# Patient Record
Sex: MALE | Race: WHITE | NOT HISPANIC OR LATINO | Employment: OTHER | URBAN - NONMETROPOLITAN AREA
[De-identification: names, ages, dates, MRNs, and addresses within clinical notes are randomized per-mention and may not be internally consistent; named-entity substitution may affect disease eponyms.]

---

## 2023-12-26 PROBLEM — Z79.01 ANTICOAGULATED: Status: ACTIVE | Noted: 2023-12-26

## 2023-12-26 PROBLEM — I48.92 PAROXYSMAL ATRIAL FLUTTER (MULTI): Status: ACTIVE | Noted: 2023-12-26

## 2023-12-26 RX ORDER — ASPIRIN 81 MG/1
81 TABLET ORAL
COMMUNITY

## 2023-12-26 RX ORDER — METOPROLOL SUCCINATE 25 MG/1
0.5 TABLET, EXTENDED RELEASE ORAL DAILY
COMMUNITY

## 2023-12-26 RX ORDER — DIGOXIN 125 MCG
125 TABLET ORAL DAILY
COMMUNITY

## 2024-01-22 ENCOUNTER — OFFICE VISIT (OUTPATIENT)
Dept: CARDIOLOGY | Facility: CLINIC | Age: 89
End: 2024-01-22
Payer: MEDICARE

## 2024-01-22 VITALS
BODY MASS INDEX: 23.95 KG/M2 | HEART RATE: 80 BPM | HEIGHT: 66 IN | DIASTOLIC BLOOD PRESSURE: 72 MMHG | SYSTOLIC BLOOD PRESSURE: 110 MMHG | WEIGHT: 149 LBS

## 2024-01-22 DIAGNOSIS — I48.92 PAROXYSMAL ATRIAL FLUTTER (MULTI): Primary | ICD-10-CM

## 2024-01-22 PROBLEM — R31.9 HEMATURIA: Status: ACTIVE | Noted: 2024-01-22

## 2024-01-22 PROBLEM — Z79.01 ANTICOAGULATED: Status: RESOLVED | Noted: 2023-12-26 | Resolved: 2024-01-22

## 2024-01-22 PROCEDURE — 1036F TOBACCO NON-USER: CPT | Performed by: INTERNAL MEDICINE

## 2024-01-22 PROCEDURE — 1159F MED LIST DOCD IN RCRD: CPT | Performed by: INTERNAL MEDICINE

## 2024-01-22 PROCEDURE — 99214 OFFICE O/P EST MOD 30 MIN: CPT | Performed by: INTERNAL MEDICINE

## 2024-01-22 RX ORDER — TAMSULOSIN HYDROCHLORIDE 0.4 MG/1
1 CAPSULE ORAL NIGHTLY
COMMUNITY
Start: 2023-07-07

## 2024-01-22 NOTE — PROGRESS NOTES
"Subjective   Omi Coronado is a 89 y.o. male       Chief Complaint    Follow-up          HPI     Patient returns in follow-up of problems as noted.  In the interim he is doing well.  He denies angina CHF or arrhythmia symptomatology.  Rate control with digoxin appears acceptable.  Variety of labs were recently performed at the [facility] and they are reviewed and found to be satisfactory.    We discussed antithrombotic therapy and he and his family are, understandably, against anticoagulant therapy because of the nature and severity of genitourinary bleeding that he had before with blood clots causing bladder neck outflow obstruction.  Because of this we will continue her with rate control strategy and aspirin but they understand that this is a compromise in regards to his treatment and they are accepting of the compromise.    Review of Systems   All other systems reviewed and are negative.         Visit Vitals  /72 (BP Location: Right arm, Patient Position: Sitting)   Pulse 80   Ht 1.676 m (5' 6\")   Wt 67.6 kg (149 lb)   BMI 24.05 kg/m²   Smoking Status Never   BSA 1.77 m²        Objective   Physical Exam  Constitutional:       Appearance: Normal appearance. He is normal weight.   HENT:      Nose: Nose normal.   Neck:      Vascular: No carotid bruit.   Cardiovascular:      Rate and Rhythm: Normal rate.      Pulses: Normal pulses.      Heart sounds: Normal heart sounds.   Pulmonary:      Effort: Pulmonary effort is normal.   Abdominal:      General: Bowel sounds are normal.      Palpations: Abdomen is soft.   Genitourinary:     Rectum: Normal.   Musculoskeletal:         General: Normal range of motion.      Cervical back: Normal range of motion.      Right lower leg: No edema.      Left lower leg: No edema.   Skin:     General: Skin is warm and dry.   Neurological:      General: No focal deficit present.      Mental Status: He is alert.   Psychiatric:         Mood and Affect: Mood normal.         Behavior: " Behavior normal.         Thought Content: Thought content normal.         Judgment: Judgment normal.         Current Medications    Current Outpatient Medications:     aspirin 81 mg EC tablet, Take 1 tablet (81 mg) by mouth 1 (one) time per week., Disp: , Rfl:     digoxin (Lanoxin) 125 MCG tablet, Take 1 tablet (125 mcg) by mouth once daily., Disp: , Rfl:     metoprolol succinate XL (Toprol-XL) 25 mg 24 hr tablet, Take 0.5 tablets (12.5 mg) by mouth once daily. Do not crush or chew., Disp: , Rfl:     tamsulosin (Flomax) 0.4 mg 24 hr capsule, Take 1 capsule (0.4 mg) by mouth once daily at bedtime., Disp: , Rfl:                      Assessment/Plan   1. Paroxysmal atrial flutter (CMS/HCC)  Noted today.  The concept of antithrombotic therapy and its omission were discussed in detail and the patient and family understand the compromise we have made.

## 2024-01-22 NOTE — LETTER
January 22, 2024     Bhavin Hunt DO  [address]  [facility] [address] [facility] [address]    Patient: Omi Coronado   YOB: 1934   Date of Visit: 1/22/2024       Dear Dr. Bhavin Hunt DO:    Thank you for referring Omi Coronado to me for evaluation. Below are my notes for this consultation.  If you have questions, please do not hesitate to call me. I look forward to following your patient along with you.       Sincerely,     Naseem Romero MD      CC: No Recipients  ______________________________________________________________________________________

## 2024-01-31 ENCOUNTER — APPOINTMENT (OUTPATIENT)
Dept: CARDIOLOGY | Facility: CLINIC | Age: 89
End: 2024-01-31
Payer: MEDICARE

## 2024-10-23 ENCOUNTER — APPOINTMENT (OUTPATIENT)
Dept: CARDIOLOGY | Facility: CLINIC | Age: 89
End: 2024-10-23
Payer: MEDICARE

## 2024-12-04 ENCOUNTER — OFFICE VISIT (OUTPATIENT)
Dept: CARDIOLOGY | Facility: CLINIC | Age: 89
End: 2024-12-04
Payer: MEDICARE

## 2024-12-04 VITALS
BODY MASS INDEX: 23.78 KG/M2 | WEIGHT: 148 LBS | SYSTOLIC BLOOD PRESSURE: 104 MMHG | HEIGHT: 66 IN | DIASTOLIC BLOOD PRESSURE: 72 MMHG | HEART RATE: 100 BPM

## 2024-12-04 DIAGNOSIS — R06.02 SHORTNESS OF BREATH: ICD-10-CM

## 2024-12-04 DIAGNOSIS — I48.21 PERMANENT ATRIAL FIBRILLATION (MULTI): ICD-10-CM

## 2024-12-04 DIAGNOSIS — Z78.9 NEVER SMOKED TOBACCO: ICD-10-CM

## 2024-12-04 PROCEDURE — 99214 OFFICE O/P EST MOD 30 MIN: CPT | Performed by: INTERNAL MEDICINE

## 2024-12-04 PROCEDURE — 1159F MED LIST DOCD IN RCRD: CPT | Performed by: INTERNAL MEDICINE

## 2024-12-04 PROCEDURE — 1036F TOBACCO NON-USER: CPT | Performed by: INTERNAL MEDICINE

## 2024-12-04 RX ORDER — ATENOLOL 25 MG/1
25 TABLET ORAL DAILY
Qty: 90 TABLET | Refills: 3 | Status: SHIPPED | OUTPATIENT
Start: 2024-12-04 | End: 2025-12-04

## 2024-12-04 ASSESSMENT — ENCOUNTER SYMPTOMS
DIARRHEA: 1
SHORTNESS OF BREATH: 1

## 2024-12-04 NOTE — PROGRESS NOTES
Subjective   Omi Coronado is a 89 y.o. male       Chief Complaint    Follow-up          HPI   89-year-old male who has previously followed with Dr. Romero.  He came with his wife and his daughter.  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  The record indicated that he has chronic atrial fibrillation and in the past developed significant hematuria on anticoagulation and therefore he is not interested in anticoagulation.  He was treated only with aspirin, digoxin and metoprolol.  The patient is hard of hearing but his daughter and wife were able to understand the conversation and relayed to the patient.  Recently has been having symptoms of what he describes as palpitations and increased heart rate after his meal.  On his own he stopped taking the metoprolol and digoxin.  He was seen by his PCP recently who told him that his heart rate was regular.  Reviewing his record I found out that he has atrial flutter which sometimes can be with regular conduction mimicking sinus rhythm.  In the office today his heart rate was about 100 bpm.  His lungs sounded normal his blood pressure is on the soft side.  He takes only Flomax for BPH.  There has been no recent falls and no hematuria denies any chest pain but his daughter indicated that when his heart increases in rate he has uncomfortable feeling in the chest which could be anginal equivalent.    Assessment/recommendations:    1- Permanent atrial fibrillation, heart rate is not controlled since patient has not taken rate control agents.  The risk of stroke was delineated to the patient and his family.  He is presently on no protection of any significance with the exception of the aspirin.  The patient is adamant against taking anticoagulants.  I did proposed given his active lifestyle consideration for the watchman's device.  Literature was provided for the review.  They will let me know down the road.  As for the heart rate control I advised  "going on atenolol 25 mg daily.  If necessary dose can be adjusted.  2-symptoms of chest discomfort with tachycardia could be angina equivalent.  There has been no recent stress test.  His last echocardiogram in 2021 revealed normal ejection fraction.  Lexiscan MPI was recommended but they would like to hold off on that.    Review of Systems   Respiratory:  Positive for shortness of breath.    Gastrointestinal:  Positive for diarrhea.   All other systems reviewed and are negative.           Vitals:    12/04/24 1036   BP: 104/72   BP Location: Right leg   Patient Position: Sitting   Pulse: 100   Weight: 67.1 kg (148 lb)   Height: 1.676 m (5' 6\")        Objective   Physical Exam  Constitutional:       Appearance: Normal appearance.   HENT:      Nose: Nose normal.   Neck:      Vascular: No carotid bruit.   Cardiovascular:      Rate and Rhythm: Tachycardia present. Rhythm irregularly irregular.      Pulses: Normal pulses.      Heart sounds: Normal heart sounds.   Pulmonary:      Effort: Pulmonary effort is normal.   Abdominal:      General: Bowel sounds are normal.      Palpations: Abdomen is soft.   Musculoskeletal:         General: Normal range of motion.      Cervical back: Normal range of motion.      Right lower leg: No edema.      Left lower leg: No edema.   Skin:     General: Skin is warm and dry.   Neurological:      General: No focal deficit present.      Mental Status: He is alert.   Psychiatric:         Mood and Affect: Mood normal.         Behavior: Behavior normal.         Thought Content: Thought content normal.         Judgment: Judgment normal.         Allergies  Penicillin     Current Medications    Current Outpatient Medications:     aspirin 81 mg EC tablet, Take 1 tablet (81 mg) by mouth 1 (one) time per week., Disp: , Rfl:     tamsulosin (Flomax) 0.4 mg 24 hr capsule, Take 1 capsule (0.4 mg) by mouth once daily at bedtime., Disp: , Rfl:                      Assessment/Plan   1. Paroxysmal atrial " flutter (Multi)        2. Shortness of breath        3. BMI 23.0-23.9, adult        4. Never smoked tobacco                 Scribe Attestation  By signing my name below, IEllen LPN, Scribe   attest that this documentation has been prepared under the direction and in the presence of Ramona Lopez MD.     Provider Attestation - Scribe documentation    All medical record entries made by the Scribe were at my direction and personally dictated by me. I have reviewed the chart and agree that the record accurately reflects my personal performance of the history, physical exam, discussion and plan.

## 2024-12-04 NOTE — LETTER
December 4, 2024     Bhavin Hunt DO  [address] [facility] [address]  [facility] [address]    Patient: Omi Coronado   YOB: 1934   Date of Visit: 12/4/2024       Dear Dr. Bhavin Hunt DO:    Thank you for referring Omi Coronado to me for evaluation. Below are my notes for this consultation.  If you have questions, please do not hesitate to call me. I look forward to following your patient along with you.       Sincerely,     Ramona Lopez MD      CC: No Recipients  ______________________________________________________________________________________    Subjective   Omi Coronado is a 89 y.o. male       Chief Complaint    Follow-up          HPI   89-year-old male who has previously followed with Dr. Romero.  He came with his wife and his daughter.  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  The record indicated that he has chronic atrial fibrillation and in the past developed significant hematuria on anticoagulation and therefore he is not interested in anticoagulation.  He was treated only with aspirin, digoxin and metoprolol.  The patient is hard of hearing but his daughter and wife were able to understand the conversation and relayed to the patient.  Recently has been having symptoms of what he describes as palpitations and increased heart rate after his meal.  On his own he stopped taking the metoprolol and digoxin.  He was seen by his PCP recently who told him that his heart rate was regular.  Reviewing his record I found out that he has atrial flutter which sometimes can be with regular conduction mimicking sinus rhythm.  In the office today his heart rate was about 100 bpm.  His lungs sounded normal his blood pressure is on the soft side.  He takes only Flomax for BPH.  There has been no recent falls and no hematuria denies any chest pain but his daughter indicated that when his heart increases in  "rate he has uncomfortable feeling in the chest which could be anginal equivalent.    Assessment/recommendations:    1- Permanent atrial fibrillation, heart rate is not controlled since patient has not taken rate control agents.  The risk of stroke was delineated to the patient and his family.  He is presently on no protection of any significance with the exception of the aspirin.  The patient is adamant against taking anticoagulants.  I did proposed given his active lifestyle consideration for the watchman's device.  Literature was provided for the review.  They will let me know down the road.  As for the heart rate control I advised going on atenolol 25 mg daily.  If necessary dose can be adjusted.  2-symptoms of chest discomfort with tachycardia could be angina equivalent.  There has been no recent stress test.  His last echocardiogram in 2021 revealed normal ejection fraction.  Lexiscan MPI was recommended but they would like to hold off on that.    Review of Systems   Respiratory:  Positive for shortness of breath.    Gastrointestinal:  Positive for diarrhea.   All other systems reviewed and are negative.           Vitals:    12/04/24 1036   BP: 104/72   BP Location: Right leg   Patient Position: Sitting   Pulse: 100   Weight: 67.1 kg (148 lb)   Height: 1.676 m (5' 6\")        Objective   Physical Exam  Constitutional:       Appearance: Normal appearance.   HENT:      Nose: Nose normal.   Neck:      Vascular: No carotid bruit.   Cardiovascular:      Rate and Rhythm: Tachycardia present. Rhythm irregularly irregular.      Pulses: Normal pulses.      Heart sounds: Normal heart sounds.   Pulmonary:      Effort: Pulmonary effort is normal.   Abdominal:      General: Bowel sounds are normal.      Palpations: Abdomen is soft.   Musculoskeletal:         General: Normal range of motion.      Cervical back: Normal range of motion.      Right lower leg: No edema.      Left lower leg: No edema.   Skin:     General: Skin is " warm and dry.   Neurological:      General: No focal deficit present.      Mental Status: He is alert.   Psychiatric:         Mood and Affect: Mood normal.         Behavior: Behavior normal.         Thought Content: Thought content normal.         Judgment: Judgment normal.         Allergies  Penicillin     Current Medications    Current Outpatient Medications:   •  aspirin 81 mg EC tablet, Take 1 tablet (81 mg) by mouth 1 (one) time per week., Disp: , Rfl:   •  tamsulosin (Flomax) 0.4 mg 24 hr capsule, Take 1 capsule (0.4 mg) by mouth once daily at bedtime., Disp: , Rfl:                      Assessment/Plan   1. Paroxysmal atrial flutter (Multi)        2. Shortness of breath        3. BMI 23.0-23.9, adult        4. Never smoked tobacco                 Scribe Attestation  By signing my name below, Ellen SANDOVAL LPN, Scribe   attest that this documentation has been prepared under the direction and in the presence of Ramona Lopez MD.     Provider Attestation - Scribe documentation    All medical record entries made by the Scribe were at my direction and personally dictated by me. I have reviewed the chart and agree that the record accurately reflects my personal performance of the history, physical exam, discussion and plan.

## 2024-12-04 NOTE — PATIENT INSTRUCTIONS
Please bring all medicines, vitamins, and herbal supplements with you when you come to the office.    Prescriptions will not be filled unless you are compliant with your follow up appointments or have a follow up appointment scheduled as per instruction of your physician. Refills should be requested at the time of your visit.     Watchman discussed. Lexiscan Stress discussed.  Tenormin 25 mg daily  Follow up 9 months

## 2025-01-09 ENCOUNTER — APPOINTMENT (OUTPATIENT)
Dept: CARDIOLOGY | Facility: CLINIC | Age: OVER 89
End: 2025-01-09
Payer: MEDICARE

## 2025-01-13 ENCOUNTER — APPOINTMENT (OUTPATIENT)
Dept: CARDIOLOGY | Facility: CLINIC | Age: OVER 89
End: 2025-01-13
Payer: MEDICARE

## 2025-09-09 ENCOUNTER — APPOINTMENT (OUTPATIENT)
Dept: CARDIOLOGY | Facility: CLINIC | Age: OVER 89
End: 2025-09-09
Payer: MEDICARE